# Patient Record
Sex: FEMALE | Race: BLACK OR AFRICAN AMERICAN | Employment: UNEMPLOYED | URBAN - METROPOLITAN AREA
[De-identification: names, ages, dates, MRNs, and addresses within clinical notes are randomized per-mention and may not be internally consistent; named-entity substitution may affect disease eponyms.]

---

## 2017-07-24 ENCOUNTER — DOCUMENTATION ONLY (OUTPATIENT)
Dept: SURGERY | Age: 42
End: 2017-07-24

## 2017-07-24 NOTE — PROGRESS NOTES
Per [facility] requirements;  E-mail and letter sent for follow up appointment. [facility] [place] [facility] [address] [facility]  DR. VALLE'S \[facility] [place] [facility]\""      Dear Dawood Rodriguez,  Your health is our main concern. It is important for your health to have follow-up lab work and to see you surgeon at 3 months, 6 months and annually after your weight loss surgery. Additionally, the Department of bariatric Surgery at our hospital is a member of the [employer] [address] Surgical Quality Improvement Program ([facility] NSQIP). As a participant in this program, we gather information on the outcomes of our patients after surgery. Please call the office for a follow up appointment at [phone] with [facility] Scott PATRICK PA-C. If you have moved out of the area or have changed surgeons please call us and let us know the name of your doctor. Your health and feedback are important to us. We greatly appreciate your response.        Thank you,  Diya Carolyn Hernandez [place] [facility] [address] [facility]

## 2018-03-26 ENCOUNTER — DOCUMENTATION ONLY (OUTPATIENT)
Dept: SURGERY | Age: 43
End: 2018-03-26

## 2018-03-26 NOTE — PROGRESS NOTES
Per [facility] requirements;  E-mail and letter sent for follow up appointment. [facility]  DR. VALLE'S [facility]      Dear Bella Ramirez,  Your health is our main concern. It is important for your health to have follow-up lab work and to see you surgeon at 3 months, 6 months and annually after your weight loss surgery. Additionally, the Department of bariatric Surgery at our hospital is a member of the [employer] [address] Surgical Quality Improvement Program ([facility] NSQIP). As a participant in this program, we gather information on the outcomes of our patients after surgery. Please call the office for a follow up appointment at [phone] with [facility] Scott PATRICK PA-C. If you have moved out of the area or have changed surgeons please call us and let us know the name of your doctor. Your health and feedback are important to us. We greatly appreciate your response.        Thank you,  [facility] 110[address] [facility]

## 2021-02-17 DIAGNOSIS — K91.2 POSTOPERATIVE MALABSORPTION: Primary | ICD-10-CM

## 2021-03-16 ENCOUNTER — HOSPITAL ENCOUNTER (OUTPATIENT)
Dept: LAB | Age: 46
Discharge: HOME | End: 2021-03-16

## 2021-03-16 LAB — XX-LABCORP SPECIMEN COL,LCBCF: NORMAL

## 2021-03-16 PROCEDURE — 99001 SPECIMEN HANDLING PT-LAB: CPT

## 2021-03-17 LAB
25(OH)D3+25(OH)D2 SERPL-MCNC: 34.3 NG/ML (ref 30–100)
ALBUMIN SERPL-MCNC: 3.7 G/DL (ref 3.8–4.8)
BASOPHILS # BLD AUTO: 0.1 X10E3/UL (ref 0–0.2)
BASOPHILS NFR BLD AUTO: 1 %
BUN SERPL-MCNC: 31 MG/DL (ref 6–24)
BUN/CREAT SERPL: 15 (ref 9–23)
CALCIUM SERPL-MCNC: 9.2 MG/DL (ref 8.7–10.2)
CHLORIDE SERPL-SCNC: 104 MMOL/L (ref 96–106)
CO2 SERPL-SCNC: 22 MMOL/L (ref 20–29)
CREAT SERPL-MCNC: 2.1 MG/DL (ref 0.57–1)
EOSINOPHIL # BLD AUTO: 0.2 X10E3/UL (ref 0–0.4)
EOSINOPHIL NFR BLD AUTO: 2 %
ERYTHROCYTE [DISTWIDTH] IN BLOOD BY AUTOMATED COUNT: 12 % (ref 11.7–15.4)
FERRITIN SERPL-MCNC: 81 NG/ML (ref 15–150)
FOLATE SERPL-MCNC: 8.1 NG/ML
GLUCOSE SERPL-MCNC: 87 MG/DL (ref 65–99)
HCT VFR BLD AUTO: 35.7 % (ref 34–46.6)
HGB BLD-MCNC: 12.2 G/DL (ref 11.1–15.9)
IMM GRANULOCYTES # BLD AUTO: 0 X10E3/UL (ref 0–0.1)
IMM GRANULOCYTES NFR BLD AUTO: 0 %
IRON SERPL-MCNC: 84 UG/DL (ref 27–159)
LYMPHOCYTES # BLD AUTO: 2.2 X10E3/UL (ref 0.7–3.1)
LYMPHOCYTES NFR BLD AUTO: 21 %
MCH RBC QN AUTO: 31.9 PG (ref 26.6–33)
MCHC RBC AUTO-ENTMCNC: 34.2 G/DL (ref 31.5–35.7)
MCV RBC AUTO: 94 FL (ref 79–97)
MONOCYTES # BLD AUTO: 0.9 X10E3/UL (ref 0.1–0.9)
MONOCYTES NFR BLD AUTO: 8 %
NEUTROPHILS # BLD AUTO: 7.1 X10E3/UL (ref 1.4–7)
NEUTROPHILS NFR BLD AUTO: 68 %
PLATELET # BLD AUTO: 303 X10E3/UL (ref 150–450)
POTASSIUM SERPL-SCNC: 4.7 MMOL/L (ref 3.5–5.2)
RBC # BLD AUTO: 3.82 X10E6/UL (ref 3.77–5.28)
SODIUM SERPL-SCNC: 140 MMOL/L (ref 134–144)
VIT B1 BLD-SCNC: NORMAL NMOL/L
VIT B12 SERPL-MCNC: 914 PG/ML (ref 232–1245)
WBC # BLD AUTO: 10.4 X10E3/UL (ref 3.4–10.8)

## 2021-03-18 ENCOUNTER — OFFICE VISIT (OUTPATIENT)
Dept: SURGERY | Age: 46
End: 2021-03-18
Payer: MEDICAID

## 2021-03-18 VITALS
BODY MASS INDEX: 39.73 KG/M2 | DIASTOLIC BLOOD PRESSURE: 83 MMHG | TEMPERATURE: 98.7 F | SYSTOLIC BLOOD PRESSURE: 144 MMHG | WEIGHT: 283.8 LBS | OXYGEN SATURATION: 95 % | HEART RATE: 77 BPM | HEIGHT: 71 IN

## 2021-03-18 DIAGNOSIS — Z78.9 WEIGHT LOSS ADVISED: ICD-10-CM

## 2021-03-18 DIAGNOSIS — E66.9 OBESITY (BMI 30-39.9): Primary | ICD-10-CM

## 2021-03-18 DIAGNOSIS — Z91.119 NONCOMPLIANCE WITH DIETARY REGIMEN REQUIRED STATUS POST BARIATRIC SURGERY: ICD-10-CM

## 2021-03-18 DIAGNOSIS — Z71.3 WEIGHT LOSS COUNSELING, ENCOUNTER FOR: ICD-10-CM

## 2021-03-18 DIAGNOSIS — Z98.84 NONCOMPLIANCE WITH DIETARY REGIMEN REQUIRED STATUS POST BARIATRIC SURGERY: ICD-10-CM

## 2021-03-18 DIAGNOSIS — Z98.84 S/P LAPAROSCOPIC SLEEVE GASTRECTOMY: ICD-10-CM

## 2021-03-18 PROCEDURE — 99213 OFFICE O/P EST LOW 20 MIN: CPT | Performed by: NURSE PRACTITIONER

## 2021-03-18 NOTE — PROGRESS NOTES
Darrel Ramirez is a 40 y.o. female  Chief Complaint   Patient presents with    Follow-up     Sleeve 4/17/13      Pt ID confirmed    Weight Loss Metrics 3/18/2021 3/18/2021 5/7/2015 5/7/2015 12/23/2014 10/30/2014 10/30/2014   Pre op / Initial Wt 331 - 332 - - 332 -   Today's Wt - 283 lb 12.8 oz - 255 lb 268 lb - 271 lb   BMI - 39.58 kg/m2 - 36.59 kg/m2 38.45 kg/m2 - 38.88 kg/m2   Ideal Body Wt 152 - 149 - - 149 -   Excess Body Wt 179 - 183 - - 183 -   Goal Wt 188 - 186 - - 186 -   Wt loss to date 47.2 - 77 - - 61 -   % Wt Loss 0.33 - 0.53 - - 0.42 -   80% .2 - 146.4 - - 146.4 -       Body mass index is 39.58 kg/m². Visit Vitals  BP (!) 144/83 (BP 1 Location: Right arm, BP Patient Position: Sitting)   Pulse 77   Temp 98.7 °F (37.1 °C)   Ht 5' 11\" (1.803 m)   Wt 283 lb 12.8 oz (128.7 kg)   SpO2 95%   BMI 39.58 kg/m²   Ms. Antonella Plaza has been given the following recommendations today due to her elevated BP reading: referred to Alternative/PCP.

## 2021-03-18 NOTE — PROGRESS NOTES
Jen Zapien is a 40 y.o. female (: 1975) presenting to address:    Chief Complaint   Patient presents with    Follow-up     Sleeve 13        Medication list and allergies have been reviewed with Jen Zapien and updated as of today's date. I have gone over all Medical, Surgical and Social History with Jen Zapien and updated/added the information accordingly. 1. Have you been to the ER, Urgent Care or Hospitalized since your last visit? NO      2. Have you followed up with your PCP or any other Physicians since your procedure/ last office visit? YES. 2021/ routine care.

## 2021-03-18 NOTE — PATIENT INSTRUCTIONS
Please follow up with the dietitian. Avelino Jones RD, Bariatric Dietitian at [facility] [phone] / [email or web address] 
 
Radha Angel RD  Bariatric Dietitian at [facility] [phone] / [email or web address] 
 
 
Weight check in 3 month

## 2021-03-18 NOTE — PROGRESS NOTES
Bariatric Postoperative Progress Note    Milagro Bustamante is a 40 y.o. female is now 8 years status post laparoscopic sleeve gastrectomy. Doing well overall. Currently on a stage 4 diet without difficulty. Although she is concerned she cannot eat a entire meal, that she cannot eat and drink together, and weight gain. Taking in 30-60 oz water,  Unknown g protein. 0 min of activity 7 days a week. Bowel movements are regular. The patient is not having any pain. . She is compliant with multivitamins, calcium, Vit D and B12 supplements. Reports \"no snacking\" but see diet recall below.      B: boiled egg with caceres/ sometimes with fruit/ sometimes slim fast shake or OJ   S: slim fast keto meal bars or activita yogurt or gold fish   L: Tuna with crackers or salads   S: slim fast advance peanut butter cup gold fish fruit hard candy chips pretzels   D: meatloaf     Drinks: OJ \"loves\"  Apple juice   Diet pepsi   Water with crystal light 30 oz     Weight Loss Metrics 3/18/2021 3/18/2021 5/7/2015 5/7/2015 12/23/2014 10/30/2014 10/30/2014   Pre op / Initial Wt 331 - 332 - - 332 -   Today's Wt - 283 lb 12.8 oz - 255 lb 268 lb - 271 lb   BMI - 39.58 kg/m2 - 36.59 kg/m2 38.45 kg/m2 - 38.88 kg/m2   Ideal Body Wt 152 - 149 - - 149 -   Excess Body Wt 179 - 183 - - 183 -   Goal Wt 188 - 186 - - 186 -   Wt loss to date 47.2 - 77 - - 61 -   % Wt Loss 0.33 - 0.53 - - 0.42 -   80% .2 - 146.4 - - 146.4 -     Comorbidities:     Hypertension: improved  Diabetes: resolved  Obstructive Sleep Apnea: not applicable  Hyperlipidemia: not applicable  Stress Urinary Incontinence: not applicable  Gastroesophageal Reflux: not applicable  Weight related arthropathy:improved      Past Medical History:   Diagnosis Date    Asthma     Autosomal recessive polycystic kidneys     Chronic kidney disease     polycystic kidney    Community acquired pneumonia     Diabetes ([facility] [address]     borderline no meds    Hypertension     Morbid obesity ([facility] [address]     Pneumothorax     Psychiatric disorder     migraines     Past Surgical History:   Procedure Laterality Date    HX GI      gastric bypass    HX LIPOMA RESECTION  9/2012    right thigh    HX ORTHOPAEDIC       Joint repair in left thigh 1988,      [place] LAP GASTRIC BYPASS/NIRAV-EN-Y       Current Outpatient Medications   Medication Sig Dispense Refill    spironolactone (ALDACTONE) 100 mg tablet Take  by mouth daily.  therapeutic multivitamin (THERAGRAN) tablet Take 1 tablet by mouth daily.  chlorothiazide (DIURIL) 250 mg tablet Take 250 mg by mouth every morning.  BIOTIN PO Take  by mouth.  calcium 500 mg Tab Take 1,500 mg by mouth.  cyanocobalamin 1,000 mcg tablet Take 1,000 mcg by mouth daily.  albuterol (PROVENTIL VENTOLIN) 2.5 mg /3 mL (0.083 %) nebulizer solution by Nebulization route once.  finasteride (PROSCAR) 5 mg tablet Take 5 mg by mouth daily.  prenatal vit-calcium-iron-fa (PRENATAL PLUS, CALCIUM CARB,) 27 mg iron- 1 mg tab Take 1 tablet by mouth daily.  TENS Units (TENS 502) lloyd Dispense one unit w/ pads and instructions on use 1 Device 0    desonide (TRIDESILON) 0.05 % cream Apply  to affected area two (2) times a day. No Known Allergies    ROS:  Review of Systems   Constitutional:        Weight gain   All other systems reviewed and are negative. Physicial Exam:  Visit Vitals  BP (!) 144/83 (BP 1 Location: Right arm, BP Patient Position: Sitting)   Pulse 77   Temp 98.7 °F (37.1 °C)   Ht 5' 11\" (1.803 m)   Wt 128.7 kg (283 lb 12.8 oz)   LMP 02/14/2021 Comment: IUD   SpO2 95%   BMI 39.58 kg/m²     Physical Exam  Vitals signs and nursing note reviewed. Constitutional:       Appearance: She is obese. HENT:      Head: Normocephalic and atraumatic. Eyes:      Pupils: Pupils are equal, round, and reactive to light. Cardiovascular:      Rate and Rhythm: Normal rate. Heart sounds: Normal heart sounds.    Pulmonary:      Effort: Pulmonary effort is normal.      Breath sounds: Normal breath sounds. Abdominal:      General: There is no distension. Palpations: Abdomen is soft. There is no mass. Tenderness: There is no abdominal tenderness. Hernia: No hernia is present. Musculoskeletal: Normal range of motion. Skin:     General: Skin is warm and dry. Neurological:      Mental Status: She is alert and oriented to person, place, and time. Psychiatric:         Mood and Affect: Mood and affect normal.         Behavior: Behavior normal.         Labs:   Recent Results (from the past 672 hour(s))   CBC WITH AUTOMATED DIFF    Collection Time: 03/16/21 12:00 AM   Result Value Ref Range    WBC 10.4 3.4 - 10.8 x10E3/uL    RBC 3.82 3.77 - 5.28 x10E6/uL    HGB 12.2 11.1 - 15.9 g/dL    HCT 35.7 34.0 - 46.6 %    MCV 94 79 - 97 fL    MCH 31.9 26.6 - 33.0 pg    MCHC 34.2 31.5 - 35.7 g/dL    RDW 12.0 11.7 - 15.4 %    PLATELET 827 670 - 615 x10E3/uL    NEUTROPHILS 68 Not Estab. %    Lymphocytes 21 Not Estab. %    MONOCYTES 8 Not Estab. %    EOSINOPHILS 2 Not Estab. %    BASOPHILS 1 Not Estab. %    ABS. NEUTROPHILS 7.1 (H) 1.4 - 7.0 x10E3/uL    Abs Lymphocytes 2.2 0.7 - 3.1 x10E3/uL    ABS. MONOCYTES 0.9 0.1 - 0.9 x10E3/uL    ABS. EOSINOPHILS 0.2 0.0 - 0.4 x10E3/uL    ABS. BASOPHILS 0.1 0.0 - 0.2 x10E3/uL    IMMATURE GRANULOCYTES 0 Not Estab. %    ABS. IMM.  GRANS. 0.0 0.0 - 0.1 N23G2/DH   METABOLIC PANEL, BASIC    Collection Time: 03/16/21 12:00 AM   Result Value Ref Range    Glucose 87 65 - 99 mg/dL    BUN 31 (H) 6 - 24 mg/dL    Creatinine 2.10 (H) 0.57 - 1.00 mg/dL    GFR est non-AA 28 (L) >59 mL/min/1.73    GFR est AA 32 (L) >59 mL/min/1.73    BUN/Creatinine ratio 15 9 - 23    Sodium 140 134 - 144 mmol/L    Potassium 4.7 3.5 - 5.2 mmol/L    Chloride 104 96 - 106 mmol/L    CO2 22 20 - 29 mmol/L    Calcium 9.2 8.7 - 10.2 mg/dL   VITAMIN B12 & FOLATE    Collection Time: 03/16/21 12:00 AM   Result Value Ref Range    Vitamin B12 914 232 - 1,245 pg/mL    Folate 8.1 >3.0 ng/mL   VITAMIN B1, WHOLE BLOOD    Collection Time: 03/16/21 12:00 AM   Result Value Ref Range    Vitamin B1 CANCELED nmol/L   VITAMIN D, 25 HYDROXY    Collection Time: 03/16/21 12:00 AM   Result Value Ref Range    VITAMIN D, 25-HYDROXY 34.3 30.0 - 100.0 ng/mL   ALBUMIN    Collection Time: 03/16/21 12:00 AM   Result Value Ref Range    Albumin 3.7 (L) 3.8 - 4.8 g/dL   IRON    Collection Time: 03/16/21 12:00 AM   Result Value Ref Range    Iron 84 27 - 159 ug/dL   FERRITIN    Collection Time: 03/16/21 12:00 AM   Result Value Ref Range    Ferritin 81 15 - 150 ng/mL   VITAMIN B12 & FOLATE    Collection Time: 03/16/21 12:00 AM   Result Value Ref Range    Vitamin B12 CANCELED pg/mL    Folate CANCELED    VITAMIN B1, WHOLE BLOOD    Collection Time: 03/16/21 12:00 AM   Result Value Ref Range    Vitamin B1 90.0 66.5 - 200.0 nmol/L   [facility] SPECIMEN COL    Collection Time: 03/16/21 12:38 PM   Result Value Ref Range    [facility] SPEC[facility]. Specimens collected/sent to [facility]. Please direct inquiries to ([phone]). Assessment/Plan: Today, Milagro Bustamante is  Height: 5' 11\" (180.3 cm) tall, Weight: 128.7 kg (283 lb 12.8 oz) lbs for a Body mass index is 39.58 kg/m². and are suffering from obesity . They are currently 8 years s/p laparoscopic sleeve gastrectomy with a total weight loss of 47lbs to date. Reviewed bariatric principles, slider foods, CHO effects. 1 week liquid diet, followed by what to eat, followed by RD visit. Labs were reviewed  and pt was instructed to continue MVI/Ca/B12/D3/B1 supplementation. Inc protein nutrition     We have reviewed the components of a successful postoperative course including requirement for a high protein, low carbohydrate diet, 64 oz a day of zero calorie liquids, daily vitamin supplementation, daily exercise (150 mis/week), regular follow-up, and participation in support groups.     Refer to registered dietitian for more detailed medical nutrition therapy as needed. The primary encounter diagnosis was Obesity (BMI 30-39.9). Diagnoses of S/P laparoscopic sleeve gastrectomy, Weight loss counseling, encounter for, Weight loss advised, and Noncompliance with dietary regimen required status post bariatric surgery were also pertinent to this visit. Follow-up and Dispositions    · Return in about 3 months (around 6/18/2021), or if symptoms worsen or fail to improve. with labs, sooner as needed.   Danna Peck NP

## 2021-03-20 LAB
FOLATE SERPL-MCNC: NORMAL NG/ML
VIT B1 BLD-SCNC: 90 NMOL/L (ref 66.5–200)
VIT B12 SERPL-MCNC: NORMAL PG/ML

## 2021-07-13 ENCOUNTER — OFFICE VISIT (OUTPATIENT)
Dept: SURGERY | Age: 46
End: 2021-07-13
Payer: MEDICAID

## 2021-07-13 VITALS
DIASTOLIC BLOOD PRESSURE: 92 MMHG | RESPIRATION RATE: 20 BRPM | SYSTOLIC BLOOD PRESSURE: 127 MMHG | BODY MASS INDEX: 43.95 KG/M2 | TEMPERATURE: 97.5 F | HEART RATE: 71 BPM | WEIGHT: 280 LBS | HEIGHT: 67 IN

## 2021-07-13 DIAGNOSIS — Z98.84 BARIATRIC SURGERY STATUS: Primary | ICD-10-CM

## 2021-07-13 DIAGNOSIS — Z98.84 S/P LAPAROSCOPIC SLEEVE GASTRECTOMY: ICD-10-CM

## 2021-07-13 DIAGNOSIS — E66.01 MORBID (SEVERE) OBESITY DUE TO EXCESS CALORIES (HCC): ICD-10-CM

## 2021-07-13 DIAGNOSIS — I10 HYPERTENSION, UNSPECIFIED TYPE: ICD-10-CM

## 2021-07-13 PROCEDURE — 99213 OFFICE O/P EST LOW 20 MIN: CPT | Performed by: NURSE PRACTITIONER

## 2021-07-13 RX ORDER — AMLODIPINE BESYLATE 5 MG/1
TABLET ORAL
COMMUNITY
Start: 2021-07-10

## 2021-07-13 RX ORDER — LORATADINE 10 MG/1
TABLET ORAL
COMMUNITY
Start: 2021-07-10

## 2021-07-13 RX ORDER — CLONIDINE HYDROCHLORIDE 0.1 MG/1
TABLET ORAL
COMMUNITY
Start: 2021-06-24

## 2021-07-13 RX ORDER — FUROSEMIDE 20 MG/1
TABLET ORAL DAILY
COMMUNITY

## 2021-07-13 RX ORDER — MONTELUKAST SODIUM 10 MG/1
TABLET ORAL
COMMUNITY
Start: 2021-07-07

## 2021-07-13 NOTE — PROGRESS NOTES
Marcus Dominique is a 40 y.o. female that presents today to follow up post  VGS preformed on 2013. Pt says pain level is at a 0 on a scale from 1-10. Pt is drinking 32oz of fluid daily. Pt is currently eating chicken,eggs,beef, fish, salads, collards, cabbage, occ brown rice, oranges, strawberries, mangos, grapes, slim fast shake. Pt says the amount of time spent exercising is walking 3 days a week for 1 hour. Body mass index is 43.85 kg/m². 1. Have you been to the ER, urgent care clinic since your last visit? Hospitalized since your last visit? No    2. Have you seen or consulted any other health care providers outside of the [address] [place] [address] since your last visit? Include any pap smears or colon screening.  No      .

## 2021-07-13 NOTE — PROGRESS NOTES
Bariatric Postoperative Progress Note    Franca Cleary is a 40 y.o. female is now 8 years status post laparoscopic sleeve gastrectomy. Doing well overall, following up for weight check. Currently on a stage 4 diet without difficulty. -3lbs from last visit     Diet recall:   Fruit salad   Slim fast protein shakes   Per in water >30-40       Weight Loss Metrics 7/13/2021 7/13/2021 3/18/2021 3/18/2021 5/7/2015 5/7/2015 12/23/2014   Pre op / Initial Wt 332 - 331 - 332 - -   Today's Wt - 280 lb - 283 lb 12.8 oz - 255 lb 268 lb   BMI - 43.85 kg/m2 - 39.58 kg/m2 - 36.59 kg/m2 38.45 kg/m2   Ideal Body Wt 140 - 152 - 149 - -   Excess Body Wt 192 - 179 - 183 - -   Goal Wt 179 - 188 - 186 - -   Wt loss to date 52 - 47.2 - 77 - -   % Wt Loss 0.34 - 0.33 - 0.53 - -   80% .6 - 143.2 - 146.4 - -       Past Medical History:   Diagnosis Date    Asthma     Autosomal recessive polycystic kidneys     Chronic kidney disease     polycystic kidney    Community acquired pneumonia     Diabetes ([facility] [address]     borderline no meds    Hypertension     Morbid obesity ([facility] [address]     Pneumothorax     Psychiatric disorder     migraines       Past Surgical History:   Procedure Laterality Date    HX GI      gastric bypass    HX LIPOMA RESECTION  9/2012    right thigh    HX ORTHOPAEDIC       Joint repair in left thigh 1988,      [place] LAP GASTRIC BYPASS/NIRAV-EN-Y         Current Outpatient Medications   Medication Sig Dispense Refill    furosemide (Lasix) 20 mg tablet Take  by mouth daily.  cloNIDine HCL (CATAPRES) 0.1 mg tablet TAKE 1 (ONE) TABLET BY MOUTH THREE TIMES DAILY, AS NEEDED, FOR SYSTOLIC BLOOD PRESSURES ABOVE 160.  amLODIPine (NORVASC) 5 mg tablet       loratadine (CLARITIN) 10 mg tablet       montelukast (SINGULAIR) 10 mg tablet       finasteride (PROSCAR) 5 mg tablet Take 5 mg by mouth daily.  spironolactone (ALDACTONE) 100 mg tablet Take  by mouth daily.       therapeutic multivitamin [facility]) tablet Take 1 tablet by mouth daily.  BIOTIN PO Take  by mouth.  calcium 500 mg Tab Take 1,500 mg by mouth.  cyanocobalamin 1,000 mcg tablet Take 1,000 mcg by mouth daily.  albuterol (PROVENTIL VENTOLIN) 2.5 mg /3 mL (0.083 %) nebulizer solution by Nebulization route once. No Known Allergies    ROS:  Review of Systems   Constitutional: Positive for weight loss. All other systems reviewed and are negative. Physicial Exam:  Visit Vitals  BP (!) 127/92 (BP 1 Location: Left upper arm, BP Patient Position: At rest, BP Cuff Size: Adult)   Pulse 71   Temp 97.5 °F (36.4 °C) (Oral)   Resp 20   Ht 5' 7\" (1.702 m)   Wt 127 kg (280 lb)   BMI 43.85 kg/m²     Physical Exam  Vitals reviewed. Constitutional:       Appearance: She is obese. HENT:      Head: Normocephalic and atraumatic. Pulmonary:      Effort: Pulmonary effort is normal.   Neurological:      Mental Status: She is alert and oriented to person, place, and time. Psychiatric:         Mood and Affect: Mood and affect normal.         Labs:   No results found for this or any previous visit (from the past 672 hour(s)). Assessment/Plan: Today, Noa Kline is  Height: 5' 7\" (170.2 cm) tall, Weight: 127 kg (280 lb) lbs for a Body mass index is 43.85 kg/m². and are suffering from morbid obesity . They are currently 8 years s/p laparoscopic sleeve gastrectomy with a total weight loss of 52 lbs to date. Continue with diet changes, cut out juices & sodas, recommend decrease  Shakes and inc solid foods, discussed gastric emptying. We have reviewed the components of a successful postoperative course including requirement for a high protein, low carbohydrate diet, 64 oz a day of zero calorie liquids, daily vitamin supplementation, daily exercise (150 mis/week), regular follow-up, and participation in support groups. Refer to registered dietitian for more detailed medical nutrition therapy.   Orders Placed This Encounter  REFERRAL TO DIETITIAN     Referral Priority:   Routine     Referral Type:   Consultation     Referral Reason:   Specialty Services Required     Referred to Provider: Dayday Zamudio     Requested Specialty:   Registered Dietitian or Nutritional Professional     Number of Visits Requested:   1            The primary encounter diagnosis was Bariatric surgery status. Diagnoses of Hypertension, unspecified type, S/P laparoscopic sleeve gastrectomy, Morbid (severe) obesity due to excess calories ([address] and BMI 40.0-44.9, adult [facility]) were also pertinent to this visit. Follow-up and Dispositions    · Return if symptoms worsen or fail to improve, for weight check.          Greta Rendon NP

## 2021-07-13 NOTE — PATIENT INSTRUCTIONS
Please follow up with the dietitian.    Carlos Langston RD, Bariatric Dietitian at [email or web address] / Sheron Greene RD  Bariatric Dietitian at [address] [facility] / [email or web address]